# Patient Record
Sex: MALE | Employment: FULL TIME | URBAN - METROPOLITAN AREA
[De-identification: names, ages, dates, MRNs, and addresses within clinical notes are randomized per-mention and may not be internally consistent; named-entity substitution may affect disease eponyms.]

---

## 2019-04-10 ENCOUNTER — OFFICE VISIT (OUTPATIENT)
Dept: FAMILY MEDICINE CLINIC | Facility: CLINIC | Age: 39
End: 2019-04-10
Payer: COMMERCIAL

## 2019-04-10 VITALS
HEART RATE: 83 BPM | HEIGHT: 69.69 IN | DIASTOLIC BLOOD PRESSURE: 77 MMHG | BODY MASS INDEX: 28.23 KG/M2 | SYSTOLIC BLOOD PRESSURE: 116 MMHG | TEMPERATURE: 99 F | WEIGHT: 195 LBS | RESPIRATION RATE: 16 BRPM

## 2019-04-10 DIAGNOSIS — R06.83 SNORING: Primary | ICD-10-CM

## 2019-04-10 PROCEDURE — 99212 OFFICE O/P EST SF 10 MIN: CPT | Performed by: FAMILY MEDICINE

## 2019-04-10 PROCEDURE — 99203 OFFICE O/P NEW LOW 30 MIN: CPT | Performed by: FAMILY MEDICINE

## 2019-04-10 NOTE — PROGRESS NOTES
HPI: Reggie Barba is a 43year old male who presents for establishment of care. Has not seen a doctor in some time. Overall, healthy. Lives with girlfriend in [facility] in [place]. Grew up in [facility]. Has 4 brothers and 2 sisters. Works for [employer].

## 2019-05-03 ENCOUNTER — OFFICE VISIT (OUTPATIENT)
Dept: SLEEP CENTER | Age: 39
End: 2019-05-03
Attending: FAMILY MEDICINE
Payer: COMMERCIAL

## 2019-05-03 DIAGNOSIS — Z76.89 SLEEP CONCERN: Primary | ICD-10-CM

## 2019-05-03 DIAGNOSIS — R06.83 SNORING: ICD-10-CM

## 2019-05-03 PROCEDURE — 95810 POLYSOM 6/> YRS 4/> PARAM: CPT

## 2019-05-05 NOTE — PROCEDURES
[address] [facility]  Accredited by the [place] [employer] (AASM)    PATIENT'S NAME: Yfn Giselle   ATTENDING PHYSICIAN: Priscilla Singh DO   REFERRING PHYSICIAN: Priscilla Singh DO   PATIENT ACCOUNT #: [de-identified] LOCATION: Sleep Ce event-related arousal index was 8.8 events per hour, and the spontaneous arousal index is 3.3 events per hour, for a combined arousal index of 5.1 events per hour. There were no significant periodic limb movements.   The lowest desaturation was to 91%, the

## 2019-05-08 ENCOUNTER — TELEPHONE (OUTPATIENT)
Dept: FAMILY MEDICINE CLINIC | Facility: CLINIC | Age: 39
End: 2019-05-08

## 2019-05-08 ENCOUNTER — OFFICE VISIT (OUTPATIENT)
Dept: FAMILY MEDICINE CLINIC | Facility: CLINIC | Age: 39
End: 2019-05-08
Payer: COMMERCIAL

## 2019-05-08 VITALS
WEIGHT: 189 LBS | SYSTOLIC BLOOD PRESSURE: 109 MMHG | RESPIRATION RATE: 16 BRPM | BODY MASS INDEX: 27 KG/M2 | TEMPERATURE: 99 F | DIASTOLIC BLOOD PRESSURE: 73 MMHG | HEART RATE: 82 BPM

## 2019-05-08 DIAGNOSIS — Z00.00 ROUTINE PHYSICAL EXAMINATION: ICD-10-CM

## 2019-05-08 DIAGNOSIS — G47.33 OBSTRUCTIVE SLEEP APNEA: Primary | ICD-10-CM

## 2019-05-08 PROCEDURE — 90471 IMMUNIZATION ADMIN: CPT | Performed by: FAMILY MEDICINE

## 2019-05-08 PROCEDURE — 90715 TDAP VACCINE 7 YRS/> IM: CPT | Performed by: FAMILY MEDICINE

## 2019-05-08 PROCEDURE — 99395 PREV VISIT EST AGE 18-39: CPT | Performed by: FAMILY MEDICINE

## 2019-05-08 NOTE — PROGRESS NOTES
HPI:  43 yr old male who presents for physical. Exercises 3 times per week. Lost 6 pounds. Started working out and drinking more water. Last TdaP- unknown. Had bloodwork a few years ago and was told he had high cholesterol.  Had sleep study recently emanuel hepatosplenomegaly  Extremities: No cyanosis, clubbing, edema. Pedal pulses 2+ renea. MSK:  No abnormalities. Skin: Warm/dry/intact. No abnormal moles/lesions noted. No rashes. Psych: Orientated to person, place, and time.   Behavior and affect appropri

## 2019-05-31 ENCOUNTER — OFFICE VISIT (OUTPATIENT)
Dept: SLEEP CENTER | Age: 39
End: 2019-05-31
Attending: FAMILY MEDICINE
Payer: COMMERCIAL

## 2019-05-31 DIAGNOSIS — Z76.89 SLEEP CONCERN: Primary | ICD-10-CM

## 2019-05-31 DIAGNOSIS — G47.33 OBSTRUCTIVE SLEEP APNEA: ICD-10-CM

## 2019-05-31 PROCEDURE — 95811 POLYSOM 6/>YRS CPAP 4/> PARM: CPT

## 2019-06-04 NOTE — PROCEDURES
[address] [facility]  Accredited by the [employer] [employer] (AASM)    PATIENT'S NAME: Danielle Gloria   ATTENDING PHYSICIAN: Parker Manley DO   REFERRING PHYSICIAN: Parker Manley,    PATIENT ACCOUNT #: [de-identified] LOCATION: Sleep Ce movements. The lowest desaturation was to 91%. The average heart rate is 61 beats per minute, and there was no significant bradycardia, asystole, tachycardia, or atrial fibrillation.       CPAP was initiated at 5 CWP and titrated upward to a final value o

## 2019-06-05 DIAGNOSIS — G47.33 MILD OBSTRUCTIVE SLEEP APNEA: Primary | ICD-10-CM

## 2019-06-17 ENCOUNTER — TELEPHONE (OUTPATIENT)
Dept: FAMILY MEDICINE CLINIC | Facility: CLINIC | Age: 39
End: 2019-06-17

## 2019-06-17 NOTE — TELEPHONE ENCOUNTER
Would like for  To know that they have been trying to contact patient to have him set up for cpap machine and he hasn't called back. They have left him 5 voicemail's.

## 2019-06-27 NOTE — TELEPHONE ENCOUNTER
Pt called back, stts he was out of town, and wasn't able to call [facility]. Phone # provided, pt advised to call [facility] and schedule a date for CPAP setup. Pt voiced understanding .

## 2023-09-25 ENCOUNTER — HOSPITAL ENCOUNTER (OUTPATIENT)
Age: 43
Discharge: HOME | End: 2023-09-25
Payer: COMMERCIAL

## 2023-09-25 VITALS
RESPIRATION RATE: 18 BRPM | SYSTOLIC BLOOD PRESSURE: 127 MMHG | DIASTOLIC BLOOD PRESSURE: 72 MMHG | HEART RATE: 95 BPM | OXYGEN SATURATION: 98 % | TEMPERATURE: 98 F

## 2023-09-25 DIAGNOSIS — R10.32 LLQ ABDOMINAL PAIN: Primary | ICD-10-CM

## 2023-09-25 PROCEDURE — 99203 OFFICE O/P NEW LOW 30 MIN: CPT | Performed by: NURSE PRACTITIONER

## 2023-09-25 NOTE — ED INITIAL ASSESSMENT (HPI)
Pt came in due to lower abdominal pain for the past 4 days. Pt stated he was working out and lifting weights prior to pain starting. Pt stated he feels a \"bulge\" under umbilicus. Pt denies any urinary s/s. Pt denies any fall. Pt has easy non labored respirations.

## 2023-09-26 ENCOUNTER — HOSPITAL ENCOUNTER (OUTPATIENT)
Age: 43
Discharge: OTHER INSTITUTION | End: 2023-09-26
Payer: COMMERCIAL

## 2023-09-26 ENCOUNTER — HOSPITAL ENCOUNTER (INPATIENT)
Facility: HOSPITAL | Age: 43
LOS: 2 days | Discharge: HOME | End: 2023-09-28
Attending: EMERGENCY MEDICINE | Admitting: INTERNAL MEDICINE
Payer: COMMERCIAL

## 2023-09-26 ENCOUNTER — APPOINTMENT (OUTPATIENT)
Dept: CT IMAGING | Age: 43
End: 2023-09-26
Attending: NURSE PRACTITIONER
Payer: COMMERCIAL

## 2023-09-26 DIAGNOSIS — R10.9 ABDOMINAL PAIN, ACUTE: Primary | ICD-10-CM

## 2023-09-26 DIAGNOSIS — K57.20 PERFORATION OF SIGMOID COLON DUE TO DIVERTICULITIS: Primary | ICD-10-CM

## 2023-09-26 DIAGNOSIS — K57.92 ACUTE DIVERTICULITIS: ICD-10-CM

## 2023-09-26 LAB
#MXD IC: 0.5 X10ˆ3/UL (ref 0.1–1)
BILIRUB UR QL STRIP: NEGATIVE
BUN BLD-MCNC: 13 MG/DL (ref 7–18)
CHLORIDE BLD-SCNC: 101 MMOL/L (ref 98–112)
CLARITY UR: CLEAR
CO2 BLD-SCNC: 30 MMOL/L (ref 21–32)
COLOR UR: YELLOW
CREAT BLD-MCNC: 1.1 MG/DL
EGFRCR SERPLBLD CKD-EPI 2021: 85 ML/MIN/1.73M2 (ref 60–?)
GLUCOSE BLD-MCNC: 85 MG/DL (ref 70–99)
GLUCOSE UR STRIP-MCNC: NEGATIVE MG/DL
HCT VFR BLD AUTO: 44.9 %
HCT VFR BLD CALC: 48 %
HGB BLD-MCNC: 14.7 G/DL
HGB UR QL STRIP: NEGATIVE
ISTAT IONIZED CALCIUM FOR CHEM 8: 1.24 MMOL/L (ref 1.12–1.32)
KETONES UR STRIP-MCNC: NEGATIVE MG/DL
LEUKOCYTE ESTERASE UR QL STRIP: NEGATIVE
LYMPHOCYTES # BLD AUTO: 2.5 X10ˆ3/UL (ref 1–4)
LYMPHOCYTES NFR BLD AUTO: 20.7 %
MCH RBC QN AUTO: 27 PG (ref 26–34)
MCHC RBC AUTO-ENTMCNC: 32.7 G/DL (ref 31–37)
MCV RBC AUTO: 82.4 FL (ref 80–100)
MIXED CELL %: 4.1 %
NEUTROPHILS # BLD AUTO: 9.1 X10ˆ3/UL (ref 1.5–7.7)
NEUTROPHILS NFR BLD AUTO: 75.2 %
NITRITE UR QL STRIP: NEGATIVE
PH UR STRIP: 5.5 [PH]
PLATELET # BLD AUTO: 184 X10ˆ3/UL (ref 150–450)
POTASSIUM BLD-SCNC: 4.3 MMOL/L (ref 3.6–5.1)
PROT UR STRIP-MCNC: 30 MG/DL
RBC # BLD AUTO: 5.45 X10ˆ6/UL
SODIUM BLD-SCNC: 139 MMOL/L (ref 136–145)
SP GR UR STRIP: 1.02
UROBILINOGEN UR STRIP-ACNC: <2 MG/DL
WBC # BLD AUTO: 12.1 X10ˆ3/UL (ref 4–11)

## 2023-09-26 PROCEDURE — 81002 URINALYSIS NONAUTO W/O SCOPE: CPT

## 2023-09-26 PROCEDURE — 85025 COMPLETE CBC W/AUTO DIFF WBC: CPT | Performed by: NURSE PRACTITIONER

## 2023-09-26 PROCEDURE — 74177 CT ABD & PELVIS W/CONTRAST: CPT | Performed by: NURSE PRACTITIONER

## 2023-09-26 PROCEDURE — 80047 BASIC METABLC PNL IONIZED CA: CPT

## 2023-09-26 PROCEDURE — 99254 IP/OBS CNSLTJ NEW/EST MOD 60: CPT | Performed by: SURGERY

## 2023-09-26 RX ORDER — METRONIDAZOLE 500 MG/100ML
500 INJECTION, SOLUTION INTRAVENOUS EVERY 8 HOURS
Status: DISCONTINUED | OUTPATIENT
Start: 2023-09-27 | End: 2023-09-28

## 2023-09-26 RX ORDER — HEPARIN SODIUM 5000 [USP'U]/ML
5000 INJECTION, SOLUTION INTRAVENOUS; SUBCUTANEOUS EVERY 12 HOURS SCHEDULED
Status: DISCONTINUED | OUTPATIENT
Start: 2023-09-26 | End: 2023-09-28

## 2023-09-26 RX ORDER — MORPHINE SULFATE 2 MG/ML
2 INJECTION, SOLUTION INTRAMUSCULAR; INTRAVENOUS EVERY 4 HOURS PRN
Status: DISCONTINUED | OUTPATIENT
Start: 2023-09-26 | End: 2023-09-28

## 2023-09-26 RX ORDER — DEXTROSE AND SODIUM CHLORIDE 5; .45 G/100ML; G/100ML
INJECTION, SOLUTION INTRAVENOUS CONTINUOUS
Status: DISCONTINUED | OUTPATIENT
Start: 2023-09-26 | End: 2023-09-28

## 2023-09-26 RX ORDER — METRONIDAZOLE 500 MG/100ML
500 INJECTION, SOLUTION INTRAVENOUS ONCE
Status: COMPLETED | OUTPATIENT
Start: 2023-09-26 | End: 2023-09-26

## 2023-09-26 NOTE — ED INITIAL ASSESSMENT (HPI)
Patient arrived ambulatory to room c/o LLQ abdominal pain x5 days. Pain is constant. No n/v/d. No urinary/bowel complaints. No fevers. Easy non labored respirations. No distress.

## 2023-09-26 NOTE — ED QUICK NOTES
Orders for admission, patient is aware of plan and ready to go upstairs. Any questions, please call ED RN Jud Burns at extension [phone].      Patient Covid vaccination status: Fully vaccinated     COVID Test Ordered in ED: None    COVID Suspicion at Admission: N/A    Running Infusions:      Mental Status/LOC at time of transport: A/Ox4    Other pertinent information:   CIWA score: N/A   NIH score:  N/A

## 2023-09-26 NOTE — ED INITIAL ASSESSMENT (HPI)
Patient arrives to ER for evaluation diverticulitis. Patient was in [address] [place] [address] and had a CT that shows Perforated bowel. Endorses abd painx 5 days.

## 2023-09-26 NOTE — ED PROVIDER NOTES
Patient Seen in: [facility]      History   Patient presents with:  Abdominal Pain    Stated Complaint: pain above groin/below belly button    Subjective:   HPI    68-year-old male presents to immediate care with complaints of left lower quadrant and suprapubic pain x5 days. Patient was seen last night at [facility] immediate care and was unable to have a CAT scan done at that time and was unable to go to the emergency department. He presents today with same pain patient denies abdominal injury. There is decreased appetite but no vomiting. No diarrhea. He states bowel movements have been normal.  He denies urinary complaints. He is afebrile. He is concerned for a hernia. Objective:   History reviewed. No pertinent past medical history. History reviewed. No pertinent surgical history. Social History     Socioeconomic History    Marital status: Single   Tobacco Use    Smoking status: Never    Smokeless tobacco: Never   Substance and Sexual Activity    Alcohol use: Yes     Alcohol/week: 1.0 standard drink of alcohol     Types: 1 Standard drinks or equivalent per week    Drug use: Never              Review of Systems    Positive for stated complaint: pain above groin/below belly button  Other systems are as noted in HPI. Constitutional and vital signs reviewed. All other systems reviewed and negative except as noted above. Physical Exam     ED Triage Vitals   BP    Pulse    Resp    Temp    Temp src    SpO2    O2 Device        Current:There were no vitals taken for this visit. Physical Exam  Vitals reviewed. Constitutional:       General: He is not in acute distress. Appearance: He is well-developed. He is not ill-appearing. HENT:      Mouth/Throat:      Mouth: Mucous membranes are moist.   Pulmonary:      Effort: Pulmonary effort is normal.      Breath sounds: Normal breath sounds. Abdominal:      General: Abdomen is flat.  Bowel sounds are normal. There is no distension. There are no signs of injury. Palpations: Abdomen is soft. Tenderness: There is abdominal tenderness in the suprapubic area and left lower quadrant. Hernia: No hernia is present. Skin:     General: Skin is warm and dry. Neurological:      General: No focal deficit present. Mental Status: He is alert. Psychiatric:         Mood and Affect: Mood normal.         Behavior: Behavior normal.               ED Course     Labs Reviewed   POCT CBC - Abnormal; Notable for the following components:       Result Value    WBC IC 12.1 (*)     # Neutrophil 9.1 (*)     All other components within normal limits   [facility] POCT URINALYSIS DIPSTICK - Abnormal; Notable for the following components:    Protein urine 30 (*)     All other components within normal limits   POCT ISTAT CHEM8 CARTRIDGE - Normal                   MDM                                         Medical Decision Making  50-year-old male presents to immediate care with complaints of left lower quadrant and suprapubic pain x5 days. Patient was seen last night at [facility] immediate care and was unable to have a CAT scan done at that time and was unable to go to the emergency department. He presents today with same pain patient denies abdominal injury. There is decreased appetite but no vomiting. No diarrhea. He states bowel movements have been normal.  He denies urinary complaints. He is afebrile. He is concerned for a hernia. On exam abdomen is soft bowel sounds are normal.  There is positive tenderness to palpate left lower quadrant and suprapubic region. There is no appreciable hernia on exam.  We will proceed with abdominal CT. POC chemistry and POC CBC is collected and is within normal limits. Urine is unremarkable. Abdominal CT shows a sigmoid perforated diverticulum. Treatment and further evaluation was discussed with Dr. Josefina Jacob.   Patient will need to be admitted to the hospital.  Phone call was made to Nicole Pastrana at the transfer center. There are no available medical beds at [facility].  Patient will go to the emergency department in private vehicle with his family. Patient is aware. He will travel with intravenous access in his right arm. Amount and/or Complexity of Data Reviewed  Labs: ordered. Details: POC CBC is WNL  POC Chem is WNL  POC urine is WNL  Radiology: ordered. Details: abdominal CT shows sigmoid perforated diverticulum. Disposition and Plan     Clinical Impression:  Abdominal pain, acute  (primary encounter diagnosis)     Disposition: Ic to ed  9/26/2023 12:16 pm    Follow-up:  No follow-up provider specified. Medications Prescribed:  There are no discharge medications for this patient.

## 2023-09-26 NOTE — PROGRESS NOTES
General surgery consult    Chart reviewed, full dictated consult to follow    Acute sigmoid diverticulitis with microperforation.  -Plan n.p.o., IV antibiotics, bowel rest, repeat imaging if warranted clinically

## 2023-09-27 LAB
ANION GAP SERPL CALC-SCNC: 5 MMOL/L (ref 0–18)
BUN BLD-MCNC: 12 MG/DL (ref 7–18)
BUN/CREAT SERPL: 11.5 (ref 10–20)
CALCIUM BLD-MCNC: 8.9 MG/DL (ref 8.5–10.1)
CHLORIDE SERPL-SCNC: 107 MMOL/L (ref 98–112)
CO2 SERPL-SCNC: 27 MMOL/L (ref 21–32)
CREAT BLD-MCNC: 1.04 MG/DL
DEPRECATED RDW RBC AUTO: 38.4 FL (ref 35.1–46.3)
EGFRCR SERPLBLD CKD-EPI 2021: 91 ML/MIN/1.73M2 (ref 60–?)
ERYTHROCYTE [DISTWIDTH] IN BLOOD BY AUTOMATED COUNT: 12.7 % (ref 11–15)
GLUCOSE BLD-MCNC: 119 MG/DL (ref 70–99)
HCT VFR BLD AUTO: 44.9 %
HGB BLD-MCNC: 15.1 G/DL
MCH RBC QN AUTO: 28 PG (ref 26–34)
MCHC RBC AUTO-ENTMCNC: 33.6 G/DL (ref 31–37)
MCV RBC AUTO: 83.1 FL
OSMOLALITY SERPL CALC.SUM OF ELEC: 289 MOSM/KG (ref 275–295)
PLATELET # BLD AUTO: 185 10(3)UL (ref 150–450)
POTASSIUM SERPL-SCNC: 4.2 MMOL/L (ref 3.5–5.1)
RBC # BLD AUTO: 5.4 X10(6)UL
SODIUM SERPL-SCNC: 139 MMOL/L (ref 136–145)
WBC # BLD AUTO: 10.4 X10(3) UL (ref 4–11)

## 2023-09-27 PROCEDURE — 99233 SBSQ HOSP IP/OBS HIGH 50: CPT | Performed by: SURGERY

## 2023-09-27 NOTE — PLAN OF CARE
Patient alert and oriented x 4. Patient denied the need for pain medication. Treating conservatively. Up independently. Voiding freely. Patient is NPO. SCDs for VTE prophylaxis. Vital signs monitored. Cough and deep breathe. Bed in lowest position, call light within reach, and non skid socks in place for fall precautions. All needs within reach. Wife and son at bedside. Rounding done by nursing staff. Plan for discharge is home pending clearance. Problem: Patient Centered Care  Goal: Patient preferences are identified and integrated in the patient's plan of care  Description: Interventions:  - What would you like us to know as we care for you?  \"I have a 11 month old son\"  - Provide timely, complete, and accurate information to patient/family  - Incorporate patient and family knowledge, values, beliefs, and cultural backgrounds into the planning and delivery of care  - Encourage patient/family to participate in care and decision-making at the level they choose  - Honor patient and family perspectives and choices  Outcome: Progressing     Problem: Patient/Family Goals  Goal: Patient/Family Long Term Goal  Description: Patient's Long Term Goal: To get better    Interventions:  - Administer abx as prescribed   - See additional Care Plan goals for specific interventions  Outcome: Progressing  Goal: Patient/Family Short Term Goal  Description: Patient's Short Term Goal: Manage pain    Interventions:   - Monitor and assess pain  - Administer pain medications as indicated   - See additional Care Plan goals for specific interventions  Outcome: Progressing     Problem: PAIN - ADULT  Goal: Verbalizes/displays adequate comfort level or patient's stated pain goal  Description: INTERVENTIONS:  - Encourage pt to monitor pain and request assistance  - Assess pain using appropriate pain scale  - Administer analgesics based on type and severity of pain and evaluate response  - Implement non-pharmacological measures as appropriate and evaluate response  - Consider cultural and social influences on pain and pain management  - Manage/alleviate anxiety  - Utilize distraction and/or relaxation techniques  - Monitor for opioid side effects  - Notify MD/LIP if interventions unsuccessful or patient reports new pain  - Anticipate increased pain with activity and pre-medicate as appropriate  Outcome: Progressing     Problem: RISK FOR INFECTION - ADULT  Goal: Absence of fever/infection during anticipated neutropenic period  Description: INTERVENTIONS  - Monitor WBC  - Administer growth factors as ordered  - Implement neutropenic guidelines  Outcome: Progressing     Problem: SAFETY ADULT - FALL  Goal: Free from fall injury  Description: INTERVENTIONS:  - Assess pt frequently for physical needs  - Identify cognitive and physical deficits and behaviors that affect risk of falls.   - [place] fall precautions as indicated by assessment.  - Educate pt/family on patient safety including physical limitations  - Instruct pt to call for assistance with activity based on assessment  - Modify environment to reduce risk of injury  - Provide assistive devices as appropriate  - Consider OT/PT consult to assist with strengthening/mobility  - Encourage toileting schedule  Outcome: Progressing     Problem: DISCHARGE PLANNING  Goal: Discharge to home or other facility with appropriate resources  Description: INTERVENTIONS:  - Identify barriers to discharge w/pt and caregiver  - Include patient/family/discharge partner in discharge planning  - Arrange for needed discharge resources and transportation as appropriate  - Identify discharge learning needs (meds, wound care, etc)  - Arrange for interpreters to assist at discharge as needed  - Consider post-discharge preferences of patient/family/discharge partner  - Complete POLST form as appropriate  - Assess patient's ability to be responsible for managing their own health  - Refer to Case Management Department for coordinating discharge planning if the patient needs post-hospital services based on physician/LIP order or complex needs related to functional status, cognitive ability or social support system  Outcome: Progressing

## 2023-09-27 NOTE — CONSULTS
[facility]    PATIENT'S NAME: Nena Edwards   ATTENDING PHYSICIAN: Anna Yuan MD   CONSULTING PHYSICIAN: Jovani Asher MD   PATIENT ACCOUNT#:   [id number]    LOCATION:  [address] [place] [address] Rd #:   [id number]       YOB: 1980  ADMISSION DATE:       09/26/2023      CONSULT DATE:  09/26/2023    REPORT OF CONSULTATION      REASON FOR CONSULTATION:  Acute diverticulitis. HISTORY OF PRESENT ILLNESS:  Patient is a pleasant 48-year-old with several days of left lower quadrant pain. Pain got worse and he came to the emergency room. CT reveals microperforation, no drainable abscess. He was started on IV antibiotics. PAST MEDICAL HISTORY:  None. PAST SURGICAL HISTORY:  None. ALLERGIES:  None. SOCIAL HISTORY:  Single. Does not smoke. Drinks occasionally. FAMILY HISTORY:  Bladder and throat cancer    REVIEW OF SYSTEMS:  Significant for abdominal pain, constipation. Otherwise, 12-point system unremarkable. PHYSICAL EXAMINATION:    GENERAL:  He appears well. VITAL SIGNS:  Stable. Afebrile at 99. HEENT:  Atraumatic, normocephalic. EOMI, PERRLA. NECK:  Supple without lymphadenopathy. No JVD. Trachea midline. LUNGS:  Clear to auscultation. No rhonchi or wheezing. HEART:  Regular rate and rhythm. No murmur, rubs, or gallops. ABDOMEN:  Soft. Minimally tender in left lower quadrant. No rebound or guarding. EXTREMITIES:  Without clubbing, cyanosis, or edema. NEUROLOGIC:  Nonfocal.      LABORATORY DATA:  White count elevated at 12.1. CT reviewed showing focal sigmoid diverticulitis, no drainable abscess. IMPRESSION:  Acute diverticulitis. PLAN:  N.p.o.  IV antibiotics. Will continue to observe. No planned surgical intervention at this time.     Dictated By Jovani Asher MD  d: 09/27/2023 08:30:49  t: 09/27/2023 09:10:27  [facility] [id number]  /

## 2023-09-28 VITALS
DIASTOLIC BLOOD PRESSURE: 69 MMHG | SYSTOLIC BLOOD PRESSURE: 116 MMHG | WEIGHT: 200 LBS | TEMPERATURE: 98 F | OXYGEN SATURATION: 96 % | RESPIRATION RATE: 18 BRPM | HEART RATE: 91 BPM | BODY MASS INDEX: 29 KG/M2

## 2023-09-28 PROCEDURE — 99233 SBSQ HOSP IP/OBS HIGH 50: CPT | Performed by: SURGERY

## 2023-09-28 RX ORDER — LEVOFLOXACIN 500 MG/1
500 TABLET, FILM COATED ORAL
Status: DISCONTINUED | OUTPATIENT
Start: 2023-09-28 | End: 2023-09-28

## 2023-09-28 RX ORDER — METRONIDAZOLE 500 MG/1
500 TABLET ORAL EVERY 8 HOURS SCHEDULED
Status: DISCONTINUED | OUTPATIENT
Start: 2023-09-28 | End: 2023-09-28

## 2023-09-28 RX ORDER — HYDROCODONE BITARTRATE AND ACETAMINOPHEN 5; 325 MG/1; MG/1
1 TABLET ORAL EVERY 6 HOURS PRN
Status: DISCONTINUED | OUTPATIENT
Start: 2023-09-28 | End: 2023-09-28

## 2023-09-28 RX ORDER — HYDROCODONE BITARTRATE AND ACETAMINOPHEN 5; 325 MG/1; MG/1
1 TABLET ORAL EVERY 6 HOURS PRN
Qty: 20 TABLET | Refills: 0 | Status: SHIPPED | OUTPATIENT
Start: 2023-09-28

## 2023-09-28 RX ORDER — HYDROCODONE BITARTRATE AND ACETAMINOPHEN 10; 325 MG/1; MG/1
1 TABLET ORAL EVERY 6 HOURS PRN
Status: DISCONTINUED | OUTPATIENT
Start: 2023-09-28 | End: 2023-09-28 | Stop reason: DRUGHIGH

## 2023-09-28 RX ORDER — LEVOFLOXACIN 500 MG/1
500 TABLET, FILM COATED ORAL DAILY
Qty: 7 TABLET | Refills: 0 | Status: SHIPPED | OUTPATIENT
Start: 2023-09-28

## 2023-09-28 RX ORDER — METRONIDAZOLE 500 MG/1
500 TABLET ORAL EVERY 8 HOURS SCHEDULED
Qty: 21 TABLET | Refills: 0 | Status: SHIPPED | OUTPATIENT
Start: 2023-09-28

## 2023-09-28 NOTE — PLAN OF CARE
Discharge orders received from medical & surgery. Sx notified & aware of increased pain with BM, pain improved with PO Norco. Discharge instructions printed & discussed with patient & family. Prescriptions sent to pharmacy. Follow up appointments discussed. All questions answered. Patient discharge home with family & no other needs. Problem: Patient Centered Care  Goal: Patient preferences are identified and integrated in the patient's plan of care  Description: Interventions:  - What would you like us to know as we care for you?  \"I have a 11 month old son\"  - Provide timely, complete, and accurate information to patient/family  - Incorporate patient and family knowledge, values, beliefs, and cultural backgrounds into the planning and delivery of care  - Encourage patient/family to participate in care and decision-making at the level they choose  - Honor patient and family perspectives and choices  Outcome: Adequate for Discharge     Problem: Patient/Family Goals  Goal: Patient/Family Long Term Goal  Description: Patient's Long Term Goal: To get better    Interventions:  - Administer abx as prescribed   - See additional Care Plan goals for specific interventions  Outcome: Adequate for Discharge  Goal: Patient/Family Short Term Goal  Description: Patient's Short Term Goal: Manage pain    Interventions:   - Monitor and assess pain  - Administer pain medications as indicated   - See additional Care Plan goals for specific interventions  Outcome: Adequate for Discharge     Problem: PAIN - ADULT  Goal: Verbalizes/displays adequate comfort level or patient's stated pain goal  Description: INTERVENTIONS:  - Encourage pt to monitor pain and request assistance  - Assess pain using appropriate pain scale  - Administer analgesics based on type and severity of pain and evaluate response  - Implement non-pharmacological measures as appropriate and evaluate response  - Consider cultural and social influences on pain and pain management  - Manage/alleviate anxiety  - Utilize distraction and/or relaxation techniques  - Monitor for opioid side effects  - Notify MD/LIP if interventions unsuccessful or patient reports new pain  - Anticipate increased pain with activity and pre-medicate as appropriate  Outcome: Adequate for Discharge     Problem: RISK FOR INFECTION - ADULT  Goal: Absence of fever/infection during anticipated neutropenic period  Description: INTERVENTIONS  - Monitor WBC  - Administer growth factors as ordered  - Implement neutropenic guidelines  Outcome: Adequate for Discharge     Problem: SAFETY ADULT - FALL  Goal: Free from fall injury  Description: INTERVENTIONS:  - Assess pt frequently for physical needs  - Identify cognitive and physical deficits and behaviors that affect risk of falls.   - [place] fall precautions as indicated by assessment.  - Educate pt/family on patient safety including physical limitations  - Instruct pt to call for assistance with activity based on assessment  - Modify environment to reduce risk of injury  - Provide assistive devices as appropriate  - Consider OT/PT consult to assist with strengthening/mobility  - Encourage toileting schedule  Outcome: Adequate for Discharge     Problem: DISCHARGE PLANNING  Goal: Discharge to home or other facility with appropriate resources  Description: INTERVENTIONS:  - Identify barriers to discharge w/pt and caregiver  - Include patient/family/discharge partner in discharge planning  - Arrange for needed discharge resources and transportation as appropriate  - Identify discharge learning needs (meds, wound care, etc)  - Arrange for interpreters to assist at discharge as needed  - Consider post-discharge preferences of patient/family/discharge partner  - Complete POLST form as appropriate  - Assess patient's ability to be responsible for managing their own health  - Refer to Case Management Department for coordinating discharge planning if the patient needs post-hospital services based on physician/LIP order or complex needs related to functional status, cognitive ability or social support system  Outcome: Adequate for Discharge

## 2023-09-28 NOTE — PLAN OF CARE
Patient alert and oriented x 4. Denied the need for pain medication. Up independently. Voiding freely. Patients diet is clear liquids with ensure TID. Heparin and scds for VTE prophylaxis. Vital signs monitored. Cough and deep breathe. Bed in lowest position, call light within reach, and non skid socks in place for fall precautions. All needs within reach. Wife and son at bedside. Rounding done by nursing staff. Plan is to advance diet as tolerated and for Dr. Patito Mustafa to evaluate pt. Problem: Patient Centered Care  Goal: Patient preferences are identified and integrated in the patient's plan of care  Description: Interventions:  - What would you like us to know as we care for you?  \"I have a 11 month old son\"  - Provide timely, complete, and accurate information to patient/family  - Incorporate patient and family knowledge, values, beliefs, and cultural backgrounds into the planning and delivery of care  - Encourage patient/family to participate in care and decision-making at the level they choose  - Honor patient and family perspectives and choices  Outcome: Progressing     Problem: Patient/Family Goals  Goal: Patient/Family Long Term Goal  Description: Patient's Long Term Goal: To get better    Interventions:  - Administer abx as prescribed   - See additional Care Plan goals for specific interventions  Outcome: Progressing  Goal: Patient/Family Short Term Goal  Description: Patient's Short Term Goal: Manage pain    Interventions:   - Monitor and assess pain  - Administer pain medications as indicated   - See additional Care Plan goals for specific interventions  Outcome: Progressing     Problem: PAIN - ADULT  Goal: Verbalizes/displays adequate comfort level or patient's stated pain goal  Description: INTERVENTIONS:  - Encourage pt to monitor pain and request assistance  - Assess pain using appropriate pain scale  - Administer analgesics based on type and severity of pain and evaluate response  - Implement non-pharmacological measures as appropriate and evaluate response  - Consider cultural and social influences on pain and pain management  - Manage/alleviate anxiety  - Utilize distraction and/or relaxation techniques  - Monitor for opioid side effects  - Notify MD/LIP if interventions unsuccessful or patient reports new pain  - Anticipate increased pain with activity and pre-medicate as appropriate  Outcome: Progressing     Problem: RISK FOR INFECTION - ADULT  Goal: Absence of fever/infection during anticipated neutropenic period  Description: INTERVENTIONS  - Monitor WBC  - Administer growth factors as ordered  - Implement neutropenic guidelines  Outcome: Progressing     Problem: SAFETY ADULT - FALL  Goal: Free from fall injury  Description: INTERVENTIONS:  - Assess pt frequently for physical needs  - Identify cognitive and physical deficits and behaviors that affect risk of falls.   - [place] fall precautions as indicated by assessment.  - Educate pt/family on patient safety including physical limitations  - Instruct pt to call for assistance with activity based on assessment  - Modify environment to reduce risk of injury  - Provide assistive devices as appropriate  - Consider OT/PT consult to assist with strengthening/mobility  - Encourage toileting schedule  Outcome: Progressing     Problem: DISCHARGE PLANNING  Goal: Discharge to home or other facility with appropriate resources  Description: INTERVENTIONS:  - Identify barriers to discharge w/pt and caregiver  - Include patient/family/discharge partner in discharge planning  - Arrange for needed discharge resources and transportation as appropriate  - Identify discharge learning needs (meds, wound care, etc)  - Arrange for interpreters to assist at discharge as needed  - Consider post-discharge preferences of patient/family/discharge partner  - Complete POLST form as appropriate  - Assess patient's ability to be responsible for managing their own health  - Refer to Case Management Department for coordinating discharge planning if the patient needs post-hospital services based on physician/LIP order or complex needs related to functional status, cognitive ability or social support system  Outcome: Progressing

## 2023-09-28 NOTE — DISCHARGE SUMMARY
[facility]    Discharge Summary    Alan Rothman Patient Status:  Inpatient    1980 MRN [id number]   Location [facility] 4W/SW/SE Attending Suni Almeida MD   [facility] Day # 2 PCP No primary care provider on file. Date of Admission: 2023   Date of Discharge: 2023    Admitting Diagnosis: Acute diverticulitis [K57.92]  Perforation of sigmoid colon due to diverticulitis [K57.20]    Disposition: Home    Discharge Diagnosis: . Principal Problem:    Perforation of sigmoid colon due to diverticulitis  Active Problems:    Acute diverticulitis      Hospital Course:   Reason for Admission: Abdominal pain    Discharge Physical Exam: General appearance: alert, appears stated age, and cooperative  Head: Normocephalic, without obvious abnormality, atraumatic  Pulmonary:  clear to auscultation bilaterally  Chest wall: no tenderness, right sided chest wall tenderness  Abdominal: soft, non-tender; bowel sounds normal; no masses,  no organomegaly  Extremities: extremities normal, atraumatic, no cyanosis or edema  Pulses: 2+ and symmetric  Skin: Skin color, texture, turgor normal. No rashes or lesions  Neurologic: Grossly normal    Hospital Course: Pt kept NPO started on IV abx, seen by [facility] home on PO abx    Complications: None    Consultants         Provider   Role Specialty     Jm Marcos MD  Consulting Physician SURGERY, GENERAL                Discharge Plan:   Discharge Condition: Stable    Current Discharge Medication List    New Orders    levoFLOXacin 500 MG Oral Tab  Take 1 tablet (500 mg total) by mouth daily. metRONIDAZOLE 500 MG Oral Tab  Take 1 tablet (500 mg total) by mouth every 8 (eight) hours. Discharge Diet: As tolerated    Discharge Activity: As tolerated    Follow up:        Follow up Labs: 2 weeks    D/W RN  Time spent 28 mins    Mariam Underwood MD, Pebbles Haley MD  2023

## 2023-09-29 ENCOUNTER — PATIENT OUTREACH (OUTPATIENT)
Dept: CASE MANAGEMENT | Age: 43
End: 2023-09-29

## 2023-09-29 DIAGNOSIS — K57.20 PERFORATION OF SIGMOID COLON DUE TO DIVERTICULITIS: Primary | ICD-10-CM

## 2023-09-29 NOTE — PROGRESS NOTES
Initial Post Discharge Follow Up   Discharge Date: 9/28/23  Contact Date: 9/29/2023    Consent Verification:  Assessment Completed With: Patient  HIPAA Verified? Yes    Discharge Dx:     Principal Problem:    Perforation of sigmoid colon due to diverticulitis  Active Problems:    Acute diverticulitis    General:   How have you been since your discharge from the hospital? Pt reports feeling much better, since hospital discharge--tolerating abx, as prescribed, following low fiber, soft, diverticulitis diet, staying hydrated. Pt will f/u with pharmacy for Hydrocodone--has not had intractable abdominal pain. Patient denies fever, chills, nausea, vomiting, diarrhea, chest pain or shortness of breath at this time. Medications:   Current Outpatient Medications   Medication Sig Dispense Refill    levoFLOXacin 500 MG Oral Tab Take 1 tablet (500 mg total) by mouth daily. 7 tablet 0    metRONIDAZOLE 500 MG Oral Tab Take 1 tablet (500 mg total) by mouth every 8 (eight) hours. 21 tablet 0    HYDROcodone-acetaminophen 5-325 MG Oral Tab Take 1 tablet by mouth every 6 (six) hours as needed. 20 tablet 0     (NCM)  Were there any medication changes noted on AVS?  yes  START taking:  HYDROcodone-acetaminophen (Norco)  levoFLOXacin (Levaquin)  metRONIDAZOLE (Flagyl)  Follow up appointments: Follow-up Information    Follow up With Specialties Details Why Contact Info   Helen Ahuja MD Internal Medicine Follow up Follow up labs in 2 weeks [facility] [address] [place] [address] Ave  [phone]   Vikki Paulson MD SURGERY, GENERAL Follow up As needed [address] [facility]  [phone]     Overall Rating: How would you rate the care you received while in the hospital?  excellent     Interventions by NCM: Spoke with pt-- feeling much better, since hospital discharge--tolerating abx, as prescribed, following low fiber, soft, diverticulitis diet, staying hydrated.  Pt will f/u with pharmacy for Hydrocodone--has not had intractable abdominal pain. Patient denies fever, chills, nausea, vomiting, diarrhea, chest pain or shortness of breath at this time. Discussed diet, activity, medications and need for f/u visits. Pt confirms he will f/u with Dr. Candi Estevez for [facility] hospital f/u, as advised and aware to call Dr. Samantha Escalante office, as needed. Patient aware when to contact PCP, once established/specialists and when to seek emergency care. No further questions/concerns at this time.

## 2023-09-29 NOTE — PROGRESS NOTES
Left message on mailbox for pt to call [facility] back for TCM. [facility] contact information [phone] included in message. Discharge Dx:     Principal Problem:    Perforation of sigmoid colon due to diverticulitis  Active Problems:    Acute diverticulitis    Follow-up Information    Follow up With Specialties Details Why Contact Info   Mateo Dumont MD Internal Medicine Follow up Follow up labs in 2 weeks [id number]  [place] [id number] [phone]   Maria Luisa Clayton MD SURGERY, GENERAL Follow up As needed [address]  [phone]